# Patient Record
Sex: MALE | Race: WHITE | Employment: FULL TIME | URBAN - METROPOLITAN AREA
[De-identification: names, ages, dates, MRNs, and addresses within clinical notes are randomized per-mention and may not be internally consistent; named-entity substitution may affect disease eponyms.]

---

## 2018-09-04 ENCOUNTER — HOSPITAL ENCOUNTER (OUTPATIENT)
Age: 33
Discharge: HOME | End: 2018-09-04
Payer: OTHER MISCELLANEOUS

## 2018-09-04 VITALS
TEMPERATURE: 97 F | HEART RATE: 85 BPM | SYSTOLIC BLOOD PRESSURE: 157 MMHG | OXYGEN SATURATION: 97 % | HEIGHT: 71 IN | WEIGHT: 230 LBS | DIASTOLIC BLOOD PRESSURE: 92 MMHG | RESPIRATION RATE: 20 BRPM | BODY MASS INDEX: 32.2 KG/M2

## 2018-09-04 DIAGNOSIS — L03.115 CELLULITIS OF RIGHT LOWER EXTREMITY: Primary | ICD-10-CM

## 2018-09-04 DIAGNOSIS — R23.8 SKIN BULLA: ICD-10-CM

## 2018-09-04 PROCEDURE — 99203 OFFICE O/P NEW LOW 30 MIN: CPT

## 2018-09-04 PROCEDURE — 99213 OFFICE O/P EST LOW 20 MIN: CPT

## 2018-09-04 RX ORDER — CEPHALEXIN 500 MG/1
500 CAPSULE ORAL 3 TIMES DAILY
Qty: 30 CAPSULE | Refills: 0 | Status: SHIPPED | OUTPATIENT
Start: 2018-09-04 | End: 2018-09-14

## 2018-09-05 NOTE — ED INITIAL ASSESSMENT (HPI)
Pt. States he injured Rt. Lower leg 7/13/18 (hit with metal cart) at work. Area was cleaned with alcohol wipes & peroxide. Seemed to heal ok, still has some scarring. Today noted a small amount of blistering and redness to the scar area.  Felt achey today

## 2018-09-05 NOTE — ED PROVIDER NOTES
Patient Seen in: THE [facility]    History   Patient presents with:  Skin: Rt. lower leg  Body ache and/or chills    Stated Complaint: WC LEG INJURY INFECTION     HPI    Melly Bernal is a 53-year-old male who presents today for evaluatio Current:BP (!) 157/92   Pulse 85   Temp 97.1 °F (36.2 °C) (Temporal)   Resp 20   Ht 180.3 cm (5' 11\")   Wt 104.3 kg   SpO2 97%   BMI 32.08 kg/m²         Physical Exam    General: A&O x 3; well-developed; well-nourished; no acute distress  HEENT: Normoceph START taking these medications    cephALEXin (KEFLEX) 500 MG Oral Cap  Take 1 capsule (500 mg total) by mouth 3 (three) times daily.   Qty: 30 capsule Refills: 0

## 2021-03-05 PROBLEM — M17.11 OSTEOARTHRITIS OF RIGHT KNEE: Status: ACTIVE | Noted: 2021-03-05

## 2021-03-05 PROBLEM — M25.561 ACUTE PAIN OF RIGHT KNEE: Status: ACTIVE | Noted: 2021-03-05
